# Patient Record
Sex: FEMALE | Race: WHITE
[De-identification: names, ages, dates, MRNs, and addresses within clinical notes are randomized per-mention and may not be internally consistent; named-entity substitution may affect disease eponyms.]

---

## 2017-11-17 ENCOUNTER — HOSPITAL ENCOUNTER (OUTPATIENT)
Dept: HOSPITAL 92 - MAMMO | Age: 72
Discharge: HOME | End: 2017-11-17
Payer: MEDICARE

## 2017-11-17 DIAGNOSIS — Z78.0: ICD-10-CM

## 2017-11-17 DIAGNOSIS — Z12.31: Primary | ICD-10-CM

## 2017-11-17 PROCEDURE — 77080 DXA BONE DENSITY AXIAL: CPT

## 2017-11-17 PROCEDURE — 77067 SCR MAMMO BI INCL CAD: CPT

## 2017-11-17 PROCEDURE — G0202 SCR MAMMO BI INCL CAD: HCPCS

## 2017-12-10 ENCOUNTER — HOSPITAL ENCOUNTER (INPATIENT)
Dept: HOSPITAL 92 - ERS | Age: 72
LOS: 4 days | Discharge: HOME | DRG: 871 | End: 2017-12-14
Attending: INTERNAL MEDICINE | Admitting: INTERNAL MEDICINE
Payer: MEDICARE

## 2017-12-10 VITALS — BODY MASS INDEX: 30.5 KG/M2

## 2017-12-10 DIAGNOSIS — E87.2: ICD-10-CM

## 2017-12-10 DIAGNOSIS — K21.9: ICD-10-CM

## 2017-12-10 DIAGNOSIS — M79.7: ICD-10-CM

## 2017-12-10 DIAGNOSIS — K55.9: ICD-10-CM

## 2017-12-10 DIAGNOSIS — F41.9: ICD-10-CM

## 2017-12-10 DIAGNOSIS — K52.9: ICD-10-CM

## 2017-12-10 DIAGNOSIS — K59.00: ICD-10-CM

## 2017-12-10 DIAGNOSIS — E66.9: ICD-10-CM

## 2017-12-10 DIAGNOSIS — E86.0: ICD-10-CM

## 2017-12-10 DIAGNOSIS — N39.41: ICD-10-CM

## 2017-12-10 DIAGNOSIS — E87.6: ICD-10-CM

## 2017-12-10 DIAGNOSIS — G89.29: ICD-10-CM

## 2017-12-10 DIAGNOSIS — N17.9: ICD-10-CM

## 2017-12-10 DIAGNOSIS — F17.210: ICD-10-CM

## 2017-12-10 DIAGNOSIS — M19.90: ICD-10-CM

## 2017-12-10 DIAGNOSIS — A41.9: Primary | ICD-10-CM

## 2017-12-10 DIAGNOSIS — J30.2: ICD-10-CM

## 2017-12-10 DIAGNOSIS — E11.42: ICD-10-CM

## 2017-12-10 DIAGNOSIS — T68.XXXA: ICD-10-CM

## 2017-12-10 DIAGNOSIS — K56.7: ICD-10-CM

## 2017-12-10 DIAGNOSIS — I10: ICD-10-CM

## 2017-12-10 DIAGNOSIS — Z79.4: ICD-10-CM

## 2017-12-10 DIAGNOSIS — Z88.1: ICD-10-CM

## 2017-12-10 DIAGNOSIS — M54.5: ICD-10-CM

## 2017-12-10 DIAGNOSIS — Z79.891: ICD-10-CM

## 2017-12-10 DIAGNOSIS — Z79.82: ICD-10-CM

## 2017-12-10 DIAGNOSIS — F32.9: ICD-10-CM

## 2017-12-10 DIAGNOSIS — R65.21: ICD-10-CM

## 2017-12-10 DIAGNOSIS — N30.00: ICD-10-CM

## 2017-12-10 DIAGNOSIS — Z88.8: ICD-10-CM

## 2017-12-10 DIAGNOSIS — E83.39: ICD-10-CM

## 2017-12-10 LAB
ALP SERPL-CCNC: 426 U/L (ref 40–150)
ALT SERPL W P-5'-P-CCNC: 22 U/L (ref 8–55)
ANION GAP SERPL CALC-SCNC: 20 MMOL/L (ref 10–20)
APTT PPP: 29.4 SEC (ref 22.9–36.1)
AST SERPL-CCNC: 41 U/L (ref 5–34)
BACTERIA UR QL AUTO: (no result) HPF
BASOPHILS # BLD AUTO: 0.1 THOU/UL (ref 0–0.2)
BASOPHILS NFR BLD AUTO: 0.7 % (ref 0–1)
BILIRUB SERPL-MCNC: 0.5 MG/DL (ref 0.2–1.2)
BUN SERPL-MCNC: 25 MG/DL (ref 9.8–20.1)
CALCIUM SERPL-MCNC: 9.5 MG/DL (ref 7.8–10.44)
CHLORIDE SERPL-SCNC: 105 MMOL/L (ref 98–107)
CK SERPL-CCNC: 152 U/L (ref 29–168)
CO2 SERPL-SCNC: 16 MMOL/L (ref 23–31)
CREAT CL PREDICTED SERPL C-G-VRATE: 0 ML/MIN (ref 70–130)
EOSINOPHIL # BLD AUTO: 0.2 THOU/UL (ref 0–0.7)
EOSINOPHIL NFR BLD AUTO: 1.1 % (ref 0–10)
GLOBULIN SER CALC-MCNC: 3.5 G/DL (ref 2.4–3.5)
GLUCOSE UR STRIP-MCNC: 100 MG/DL
HCT VFR BLD CALC: 48 % (ref 36–47)
HYALINE CASTS #/AREA URNS LPF: (no result) LPF
LACTATE SERPL-SCNC: 6.5 MMOL/L (ref 0.5–2.2)
LIPASE SERPL-CCNC: 123 U/L (ref 8–78)
LYMPHOCYTES # BLD: 4.8 THOU/UL (ref 1.2–3.4)
LYMPHOCYTES NFR BLD AUTO: 24.7 % (ref 21–51)
MONOCYTES # BLD AUTO: 0.4 THOU/UL (ref 0.11–0.59)
MONOCYTES NFR BLD AUTO: 2.1 % (ref 0–10)
NEUTROPHILS # BLD AUTO: 13.8 THOU/UL (ref 1.4–6.5)
PROT UR STRIP.AUTO-MCNC: 100 MG/DL
PROTHROMBIN TIME: 15.4 SEC (ref 12–14.7)
RBC # BLD AUTO: 4.82 MILL/UL (ref 4.2–5.4)
RBC UR QL AUTO: (no result) HPF (ref 0–3)
TROPONIN I SERPL DL<=0.01 NG/ML-MCNC: (no result) NG/ML (ref ?–0.03)
WBC # BLD AUTO: 19.3 THOU/UL (ref 4.8–10.8)
WBC UR QL AUTO: (no result) HPF (ref 0–3)

## 2017-12-10 PROCEDURE — 86901 BLOOD TYPING SEROLOGIC RH(D): CPT

## 2017-12-10 PROCEDURE — 87328 CRYPTOSPORIDIUM AG IA: CPT

## 2017-12-10 PROCEDURE — 83605 ASSAY OF LACTIC ACID: CPT

## 2017-12-10 PROCEDURE — 87899 AGENT NOS ASSAY W/OPTIC: CPT

## 2017-12-10 PROCEDURE — 87324 CLOSTRIDIUM AG IA: CPT

## 2017-12-10 PROCEDURE — 80202 ASSAY OF VANCOMYCIN: CPT

## 2017-12-10 PROCEDURE — 74177 CT ABD & PELVIS W/CONTRAST: CPT

## 2017-12-10 PROCEDURE — 87086 URINE CULTURE/COLONY COUNT: CPT

## 2017-12-10 PROCEDURE — S0028 INJECTION, FAMOTIDINE, 20 MG: HCPCS

## 2017-12-10 PROCEDURE — 70450 CT HEAD/BRAIN W/O DYE: CPT

## 2017-12-10 PROCEDURE — 96360 HYDRATION IV INFUSION INIT: CPT

## 2017-12-10 PROCEDURE — 80053 COMPREHEN METABOLIC PANEL: CPT

## 2017-12-10 PROCEDURE — 96365 THER/PROPH/DIAG IV INF INIT: CPT

## 2017-12-10 PROCEDURE — 93005 ELECTROCARDIOGRAM TRACING: CPT

## 2017-12-10 PROCEDURE — 74020: CPT

## 2017-12-10 PROCEDURE — 82274 ASSAY TEST FOR BLOOD FECAL: CPT

## 2017-12-10 PROCEDURE — 84100 ASSAY OF PHOSPHORUS: CPT

## 2017-12-10 PROCEDURE — 83880 ASSAY OF NATRIURETIC PEPTIDE: CPT

## 2017-12-10 PROCEDURE — 80048 BASIC METABOLIC PNL TOTAL CA: CPT

## 2017-12-10 PROCEDURE — 96368 THER/DIAG CONCURRENT INF: CPT

## 2017-12-10 PROCEDURE — 81003 URINALYSIS AUTO W/O SCOPE: CPT

## 2017-12-10 PROCEDURE — 36416 COLLJ CAPILLARY BLOOD SPEC: CPT

## 2017-12-10 PROCEDURE — 85730 THROMBOPLASTIN TIME PARTIAL: CPT

## 2017-12-10 PROCEDURE — 87045 FECES CULTURE AEROBIC BACT: CPT

## 2017-12-10 PROCEDURE — 87077 CULTURE AEROBIC IDENTIFY: CPT

## 2017-12-10 PROCEDURE — 85025 COMPLETE CBC W/AUTO DIFF WBC: CPT

## 2017-12-10 PROCEDURE — 36415 COLL VENOUS BLD VENIPUNCTURE: CPT

## 2017-12-10 PROCEDURE — 94760 N-INVAS EAR/PLS OXIMETRY 1: CPT

## 2017-12-10 PROCEDURE — 81015 MICROSCOPIC EXAM OF URINE: CPT

## 2017-12-10 PROCEDURE — 51702 INSERT TEMP BLADDER CATH: CPT

## 2017-12-10 PROCEDURE — 86900 BLOOD TYPING SEROLOGIC ABO: CPT

## 2017-12-10 PROCEDURE — 87329 GIARDIA AG IA: CPT

## 2017-12-10 PROCEDURE — 82553 CREATINE MB FRACTION: CPT

## 2017-12-10 PROCEDURE — 87015 SPECIMEN INFECT AGNT CONCNTJ: CPT

## 2017-12-10 PROCEDURE — 87449 NOS EACH ORGANISM AG IA: CPT

## 2017-12-10 PROCEDURE — 87040 BLOOD CULTURE FOR BACTERIA: CPT

## 2017-12-10 PROCEDURE — 84484 ASSAY OF TROPONIN QUANT: CPT

## 2017-12-10 PROCEDURE — A4216 STERILE WATER/SALINE, 10 ML: HCPCS

## 2017-12-10 PROCEDURE — 85610 PROTHROMBIN TIME: CPT

## 2017-12-10 PROCEDURE — 86850 RBC ANTIBODY SCREEN: CPT

## 2017-12-10 PROCEDURE — 82533 TOTAL CORTISOL: CPT

## 2017-12-10 PROCEDURE — 87046 STOOL CULTR AEROBIC BACT EA: CPT

## 2017-12-10 PROCEDURE — 87186 SC STD MICRODIL/AGAR DIL: CPT

## 2017-12-10 PROCEDURE — 83690 ASSAY OF LIPASE: CPT

## 2017-12-10 PROCEDURE — 71260 CT THORAX DX C+: CPT

## 2017-12-10 PROCEDURE — 83735 ASSAY OF MAGNESIUM: CPT

## 2017-12-10 RX ADMIN — Medication SCH: at 21:37

## 2017-12-10 RX ADMIN — HYDROCORTISONE SODIUM SUCCINATE SCH MG: 100 INJECTION, POWDER, FOR SOLUTION INTRAMUSCULAR; INTRAVENOUS at 21:35

## 2017-12-10 RX ADMIN — HYDROCORTISONE SODIUM SUCCINATE SCH MG: 100 INJECTION, POWDER, FOR SOLUTION INTRAMUSCULAR; INTRAVENOUS at 15:33

## 2017-12-10 RX ADMIN — PIPERACILLIN SODIUM AND TAZOBACTAM SODIUM SCH MLS: 3; .375 INJECTION, POWDER, LYOPHILIZED, FOR SOLUTION INTRAVENOUS at 23:59

## 2017-12-10 RX ADMIN — PIPERACILLIN SODIUM AND TAZOBACTAM SODIUM SCH MLS: 3; .375 INJECTION, POWDER, LYOPHILIZED, FOR SOLUTION INTRAVENOUS at 18:09

## 2017-12-10 RX ADMIN — FAMOTIDINE SCH MG: 10 INJECTION, SOLUTION INTRAVENOUS at 21:36

## 2017-12-11 LAB
ALP SERPL-CCNC: 260 U/L (ref 40–150)
ALT SERPL W P-5'-P-CCNC: 20 U/L (ref 8–55)
ANION GAP SERPL CALC-SCNC: 15 MMOL/L (ref 10–20)
AST SERPL-CCNC: 48 U/L (ref 5–34)
BILIRUB SERPL-MCNC: 0.4 MG/DL (ref 0.2–1.2)
BUN SERPL-MCNC: 25 MG/DL (ref 9.8–20.1)
CALCIUM SERPL-MCNC: 7.8 MG/DL (ref 7.8–10.44)
CHLORIDE SERPL-SCNC: 114 MMOL/L (ref 98–107)
CO2 SERPL-SCNC: 15 MMOL/L (ref 23–31)
CREAT CL PREDICTED SERPL C-G-VRATE: 47 ML/MIN (ref 70–130)
GLOBULIN SER CALC-MCNC: 2.8 G/DL (ref 2.4–3.5)
HCT VFR BLD CALC: 40.7 % (ref 36–47)
LIPASE SERPL-CCNC: 10 U/L (ref 8–78)
MAGNESIUM SERPL-MCNC: 2.1 MG/DL (ref 1.6–2.6)
RBC # BLD AUTO: 4.07 MILL/UL (ref 4.2–5.4)
WBC # BLD AUTO: 27.5 THOU/UL (ref 4.8–10.8)

## 2017-12-11 RX ADMIN — PIPERACILLIN SODIUM AND TAZOBACTAM SODIUM SCH MLS: 3; .375 INJECTION, POWDER, LYOPHILIZED, FOR SOLUTION INTRAVENOUS at 11:22

## 2017-12-11 RX ADMIN — Medication SCH: at 08:18

## 2017-12-11 RX ADMIN — BETHANECHOL CHLORIDE SCH MG: 10 TABLET ORAL at 20:42

## 2017-12-11 RX ADMIN — DILTIAZEM HYDROCHLORIDE SCH MG: 60 CAPSULE, EXTENDED RELEASE ORAL at 20:42

## 2017-12-11 RX ADMIN — Medication SCH ML: at 20:43

## 2017-12-11 RX ADMIN — INSULIN LISPRO PRN UNIT: 100 INJECTION, SOLUTION INTRAVENOUS; SUBCUTANEOUS at 06:15

## 2017-12-11 RX ADMIN — PIPERACILLIN SODIUM AND TAZOBACTAM SODIUM SCH MLS: 3; .375 INJECTION, POWDER, LYOPHILIZED, FOR SOLUTION INTRAVENOUS at 18:04

## 2017-12-11 RX ADMIN — HYDROCODONE BITARTRATE AND ACETAMINOPHEN PRN TAB: 10; 325 TABLET ORAL at 21:00

## 2017-12-11 RX ADMIN — DILTIAZEM HYDROCHLORIDE SCH MG: 60 CAPSULE, EXTENDED RELEASE ORAL at 11:17

## 2017-12-11 RX ADMIN — FAMOTIDINE SCH MG: 10 INJECTION, SOLUTION INTRAVENOUS at 08:17

## 2017-12-11 RX ADMIN — PIPERACILLIN SODIUM AND TAZOBACTAM SODIUM SCH MLS: 3; .375 INJECTION, POWDER, LYOPHILIZED, FOR SOLUTION INTRAVENOUS at 05:55

## 2017-12-11 RX ADMIN — BETHANECHOL CHLORIDE SCH: 10 TABLET ORAL at 16:09

## 2017-12-11 RX ADMIN — HYDROCORTISONE SODIUM SUCCINATE SCH MG: 100 INJECTION, POWDER, FOR SOLUTION INTRAMUSCULAR; INTRAVENOUS at 08:22

## 2017-12-11 RX ADMIN — VANCOMYCIN HYDROCHLORIDE SCH MLS: 1 INJECTION, SOLUTION INTRAVENOUS at 13:07

## 2017-12-11 RX ADMIN — BETHANECHOL CHLORIDE SCH MG: 10 TABLET ORAL at 11:15

## 2017-12-11 RX ADMIN — HYDROCORTISONE SODIUM SUCCINATE SCH MG: 100 INJECTION, POWDER, FOR SOLUTION INTRAMUSCULAR; INTRAVENOUS at 02:18

## 2017-12-12 LAB
ALP SERPL-CCNC: 160 U/L (ref 40–150)
ALT SERPL W P-5'-P-CCNC: 24 U/L (ref 8–55)
ANION GAP SERPL CALC-SCNC: 9 MMOL/L (ref 10–20)
AST SERPL-CCNC: 63 U/L (ref 5–34)
BILIRUB SERPL-MCNC: 0.5 MG/DL (ref 0.2–1.2)
BUN SERPL-MCNC: 15 MG/DL (ref 9.8–20.1)
CALCIUM SERPL-MCNC: 8.2 MG/DL (ref 7.8–10.44)
CHLORIDE SERPL-SCNC: 114 MMOL/L (ref 98–107)
CO2 SERPL-SCNC: 20 MMOL/L (ref 23–31)
CREAT CL PREDICTED SERPL C-G-VRATE: 70 ML/MIN (ref 70–130)
GLOBULIN SER CALC-MCNC: 2.4 G/DL (ref 2.4–3.5)
HCT VFR BLD CALC: 33.9 % (ref 36–47)
MAGNESIUM SERPL-MCNC: 1.8 MG/DL (ref 1.6–2.6)
RBC # BLD AUTO: 3.41 MILL/UL (ref 4.2–5.4)
VANCOMYCIN TROUGH SERPL-MCNC: 5.5 UG/ML
WBC # BLD AUTO: 16.7 THOU/UL (ref 4.8–10.8)

## 2017-12-12 RX ADMIN — INSULIN LISPRO PRN UNIT: 100 INJECTION, SOLUTION INTRAVENOUS; SUBCUTANEOUS at 13:10

## 2017-12-12 RX ADMIN — PIPERACILLIN SODIUM AND TAZOBACTAM SODIUM SCH MLS: 3; .375 INJECTION, POWDER, LYOPHILIZED, FOR SOLUTION INTRAVENOUS at 05:51

## 2017-12-12 RX ADMIN — PIPERACILLIN SODIUM AND TAZOBACTAM SODIUM SCH MLS: 3; .375 INJECTION, POWDER, LYOPHILIZED, FOR SOLUTION INTRAVENOUS at 12:59

## 2017-12-12 RX ADMIN — Medication SCH: at 21:08

## 2017-12-12 RX ADMIN — VANCOMYCIN HYDROCHLORIDE SCH: 1 INJECTION, SOLUTION INTRAVENOUS at 13:42

## 2017-12-12 RX ADMIN — DILTIAZEM HYDROCHLORIDE SCH MG: 60 CAPSULE, EXTENDED RELEASE ORAL at 20:52

## 2017-12-12 RX ADMIN — PIPERACILLIN SODIUM AND TAZOBACTAM SODIUM SCH MLS: 3; .375 INJECTION, POWDER, LYOPHILIZED, FOR SOLUTION INTRAVENOUS at 18:28

## 2017-12-12 RX ADMIN — DILTIAZEM HYDROCHLORIDE SCH MG: 60 CAPSULE, EXTENDED RELEASE ORAL at 09:06

## 2017-12-12 RX ADMIN — HYDROCODONE BITARTRATE AND ACETAMINOPHEN PRN TAB: 10; 325 TABLET ORAL at 10:23

## 2017-12-12 RX ADMIN — VANCOMYCIN HYDROCHLORIDE SCH MLS: 1 INJECTION, SOLUTION INTRAVENOUS at 14:17

## 2017-12-12 RX ADMIN — PIPERACILLIN SODIUM AND TAZOBACTAM SODIUM SCH MLS: 3; .375 INJECTION, POWDER, LYOPHILIZED, FOR SOLUTION INTRAVENOUS at 00:14

## 2017-12-12 RX ADMIN — BETHANECHOL CHLORIDE SCH MG: 10 TABLET ORAL at 09:04

## 2017-12-12 RX ADMIN — PIPERACILLIN SODIUM AND TAZOBACTAM SODIUM SCH MLS: 3; .375 INJECTION, POWDER, LYOPHILIZED, FOR SOLUTION INTRAVENOUS at 23:07

## 2017-12-12 RX ADMIN — Medication SCH ML: at 09:21

## 2017-12-12 RX ADMIN — BETHANECHOL CHLORIDE SCH MG: 10 TABLET ORAL at 20:50

## 2017-12-12 RX ADMIN — HYDROCODONE BITARTRATE AND ACETAMINOPHEN PRN TAB: 10; 325 TABLET ORAL at 21:03

## 2017-12-12 RX ADMIN — BETHANECHOL CHLORIDE SCH MG: 10 TABLET ORAL at 15:10

## 2017-12-13 LAB
ANION GAP SERPL CALC-SCNC: 9 MMOL/L (ref 10–20)
BASOPHILS # BLD AUTO: 0 THOU/UL (ref 0–0.2)
BASOPHILS NFR BLD AUTO: 0.2 % (ref 0–1)
BUN SERPL-MCNC: 9 MG/DL (ref 9.8–20.1)
CALCIUM SERPL-MCNC: 8.9 MG/DL (ref 7.8–10.44)
CHLORIDE SERPL-SCNC: 109 MMOL/L (ref 98–107)
CO2 SERPL-SCNC: 26 MMOL/L (ref 23–31)
CREAT CL PREDICTED SERPL C-G-VRATE: 68 ML/MIN (ref 70–130)
EOSINOPHIL # BLD AUTO: 0.1 THOU/UL (ref 0–0.7)
EOSINOPHIL NFR BLD AUTO: 1.2 % (ref 0–10)
HCT VFR BLD CALC: 34.7 % (ref 36–47)
LYMPHOCYTES # BLD: 2 THOU/UL (ref 1.2–3.4)
LYMPHOCYTES NFR BLD AUTO: 16.3 % (ref 21–51)
MONOCYTES # BLD AUTO: 0.5 THOU/UL (ref 0.11–0.59)
MONOCYTES NFR BLD AUTO: 4.3 % (ref 0–10)
NEUTROPHILS # BLD AUTO: 9.5 THOU/UL (ref 1.4–6.5)
RBC # BLD AUTO: 3.48 MILL/UL (ref 4.2–5.4)
WBC # BLD AUTO: 12.1 THOU/UL (ref 4.8–10.8)

## 2017-12-13 RX ADMIN — DILTIAZEM HYDROCHLORIDE SCH MG: 60 CAPSULE, EXTENDED RELEASE ORAL at 20:31

## 2017-12-13 RX ADMIN — DILTIAZEM HYDROCHLORIDE SCH MG: 60 CAPSULE, EXTENDED RELEASE ORAL at 09:01

## 2017-12-13 RX ADMIN — BETHANECHOL CHLORIDE SCH MG: 10 TABLET ORAL at 09:04

## 2017-12-13 RX ADMIN — BETHANECHOL CHLORIDE SCH MG: 10 TABLET ORAL at 16:38

## 2017-12-13 RX ADMIN — VANCOMYCIN HYDROCHLORIDE SCH MLS: 1 INJECTION, SOLUTION INTRAVENOUS at 12:37

## 2017-12-13 RX ADMIN — VANCOMYCIN HYDROCHLORIDE SCH MLS: 1 INJECTION, SOLUTION INTRAVENOUS at 01:41

## 2017-12-13 RX ADMIN — PIPERACILLIN SODIUM AND TAZOBACTAM SODIUM SCH MLS: 3; .375 INJECTION, POWDER, LYOPHILIZED, FOR SOLUTION INTRAVENOUS at 11:33

## 2017-12-13 RX ADMIN — Medication SCH: at 20:32

## 2017-12-13 RX ADMIN — BETHANECHOL CHLORIDE SCH MG: 10 TABLET ORAL at 20:29

## 2017-12-13 RX ADMIN — Medication SCH: at 09:04

## 2017-12-13 RX ADMIN — PIPERACILLIN SODIUM AND TAZOBACTAM SODIUM SCH MLS: 3; .375 INJECTION, POWDER, LYOPHILIZED, FOR SOLUTION INTRAVENOUS at 04:18

## 2017-12-14 VITALS — TEMPERATURE: 98.1 F | DIASTOLIC BLOOD PRESSURE: 68 MMHG | SYSTOLIC BLOOD PRESSURE: 149 MMHG

## 2017-12-14 RX ADMIN — BETHANECHOL CHLORIDE SCH MG: 10 TABLET ORAL at 10:05

## 2017-12-14 RX ADMIN — DILTIAZEM HYDROCHLORIDE SCH MG: 60 CAPSULE, EXTENDED RELEASE ORAL at 10:06

## 2017-12-14 RX ADMIN — Medication SCH ML: at 10:08

## 2017-12-16 ENCOUNTER — HOSPITAL ENCOUNTER (EMERGENCY)
Dept: HOSPITAL 18 - NAV ERS | Age: 72
LOS: 1 days | Discharge: HOME | End: 2017-12-17
Payer: MEDICARE

## 2017-12-16 DIAGNOSIS — I10: ICD-10-CM

## 2017-12-16 DIAGNOSIS — F32.9: ICD-10-CM

## 2017-12-16 DIAGNOSIS — Z79.899: ICD-10-CM

## 2017-12-16 DIAGNOSIS — Z79.84: ICD-10-CM

## 2017-12-16 DIAGNOSIS — E11.9: ICD-10-CM

## 2017-12-16 DIAGNOSIS — G89.29: ICD-10-CM

## 2017-12-16 DIAGNOSIS — K21.9: Primary | ICD-10-CM

## 2017-12-16 DIAGNOSIS — F17.210: ICD-10-CM

## 2017-12-16 DIAGNOSIS — M54.9: ICD-10-CM

## 2017-12-16 LAB
ALBUMIN SERPL BCG-MCNC: 3.4 G/DL (ref 3.4–4.8)
ALP SERPL-CCNC: 77 U/L (ref 40–150)
ALT SERPL W P-5'-P-CCNC: 24 U/L (ref 8–55)
AMYLASE SERPL-CCNC: 40 U/L (ref 20–160)
ANION GAP SERPL CALC-SCNC: 13 MMOL/L (ref 10–20)
APTT PPP: 34.4 SEC (ref 22.9–36.1)
AST SERPL-CCNC: 38 U/L (ref 5–34)
BASOPHILS # BLD AUTO: 0.1 THOU/UL (ref 0–0.2)
BASOPHILS NFR BLD AUTO: 1.1 % (ref 0–1)
BILIRUB DIRECT SERPL-MCNC: 0.2 MG/DL (ref 0.1–0.3)
BILIRUB SERPL-MCNC: 0.3 MG/DL (ref 0.2–1.2)
BUN SERPL-MCNC: 11 MG/DL (ref 9.8–20.1)
CALCIUM SERPL-MCNC: 8.8 MG/DL (ref 7.8–10.44)
CHLORIDE SERPL-SCNC: 107 MMOL/L (ref 98–107)
CK MB SERPL-MCNC: 3.8 NG/ML (ref 0–6.6)
CO2 SERPL-SCNC: 25 MMOL/L (ref 23–31)
CREAT CL PREDICTED SERPL C-G-VRATE: 0 ML/MIN (ref 70–130)
EOSINOPHIL # BLD AUTO: 0.3 THOU/UL (ref 0–0.7)
EOSINOPHIL NFR BLD AUTO: 3.8 % (ref 0–10)
GLUCOSE SERPL-MCNC: 146 MG/DL (ref 83–110)
HGB BLD-MCNC: 10.5 G/DL (ref 12–16)
INR PPP: 1
LYMPHOCYTES # BLD AUTO: 2 THOU/UL (ref 1.2–3.4)
LYMPHOCYTES NFR BLD AUTO: 25.4 % (ref 21–51)
MCH RBC QN AUTO: 30.3 PG (ref 27–31)
MCV RBC AUTO: 93.5 FL (ref 81–99)
MONOCYTES # BLD AUTO: 0.8 THOU/UL (ref 0.11–0.59)
MONOCYTES NFR BLD AUTO: 9.7 % (ref 0–10)
NEUTROPHILS # BLD AUTO: 4.7 THOU/UL (ref 1.4–6.5)
NEUTROPHILS NFR BLD AUTO: 60 % (ref 42–75)
PLATELET # BLD AUTO: 185 THOU/UL (ref 130–400)
POTASSIUM SERPL-SCNC: 3.3 MMOL/L (ref 3.5–5.1)
PROTHROMBIN TIME: 13.5 SEC (ref 12–14.7)
RBC # BLD AUTO: 3.47 MILL/UL (ref 4.2–5.4)
SODIUM SERPL-SCNC: 142 MMOL/L (ref 136–145)
TROPONIN I SERPL DL<=0.01 NG/ML-MCNC: (no result) NG/ML (ref ?–0.03)
WBC # BLD AUTO: 7.8 THOU/UL (ref 4.8–10.8)

## 2017-12-16 PROCEDURE — 85730 THROMBOPLASTIN TIME PARTIAL: CPT

## 2017-12-16 PROCEDURE — 80076 HEPATIC FUNCTION PANEL: CPT

## 2017-12-16 PROCEDURE — 81003 URINALYSIS AUTO W/O SCOPE: CPT

## 2017-12-16 PROCEDURE — 81015 MICROSCOPIC EXAM OF URINE: CPT

## 2017-12-16 PROCEDURE — 96376 TX/PRO/DX INJ SAME DRUG ADON: CPT

## 2017-12-16 PROCEDURE — 80048 BASIC METABOLIC PNL TOTAL CA: CPT

## 2017-12-16 PROCEDURE — 84484 ASSAY OF TROPONIN QUANT: CPT

## 2017-12-16 PROCEDURE — 85025 COMPLETE CBC W/AUTO DIFF WBC: CPT

## 2017-12-16 PROCEDURE — 82150 ASSAY OF AMYLASE: CPT

## 2017-12-16 PROCEDURE — 96374 THER/PROPH/DIAG INJ IV PUSH: CPT

## 2017-12-16 PROCEDURE — 82553 CREATINE MB FRACTION: CPT

## 2017-12-16 PROCEDURE — 83605 ASSAY OF LACTIC ACID: CPT

## 2017-12-16 PROCEDURE — 96375 TX/PRO/DX INJ NEW DRUG ADDON: CPT

## 2017-12-16 PROCEDURE — 85610 PROTHROMBIN TIME: CPT

## 2017-12-16 PROCEDURE — 83690 ASSAY OF LIPASE: CPT

## 2017-12-16 PROCEDURE — S0028 INJECTION, FAMOTIDINE, 20 MG: HCPCS

## 2017-12-17 LAB
BACTERIA UR QL AUTO: (no result) HPF
GLUCOSE UR STRIP-MCNC: 100 MG/DL
PROT UR STRIP.AUTO-MCNC: 30 MG/DL
RBC UR QL AUTO: (no result) HPF (ref 0–3)
SP GR UR STRIP: 1.02 (ref 1–1.03)
WBC UR QL AUTO: (no result) HPF (ref 0–3)

## 2019-01-24 ENCOUNTER — HOSPITAL ENCOUNTER (OUTPATIENT)
Dept: HOSPITAL 18 - NAV LABSP | Age: 74
Discharge: HOME | End: 2019-01-24
Attending: FAMILY MEDICINE
Payer: MEDICARE

## 2019-01-24 DIAGNOSIS — N39.41: Primary | ICD-10-CM

## 2019-01-24 LAB
BACTERIA UR QL AUTO: (no result) HPF
GLUCOSE UR STRIP-MCNC: >=1000 MG/DL
RBC UR QL AUTO: (no result) HPF (ref 0–3)
SP GR UR STRIP: 1.02 (ref 1–1.03)
WBC UR QL AUTO: (no result) HPF (ref 0–3)

## 2019-01-24 PROCEDURE — 87086 URINE CULTURE/COLONY COUNT: CPT

## 2019-01-24 PROCEDURE — 87077 CULTURE AEROBIC IDENTIFY: CPT

## 2019-01-24 PROCEDURE — 81001 URINALYSIS AUTO W/SCOPE: CPT

## 2019-04-30 ENCOUNTER — HOSPITAL ENCOUNTER (OUTPATIENT)
Dept: HOSPITAL 18 - NAV RAD | Age: 74
Discharge: HOME | End: 2019-04-30
Payer: MEDICARE

## 2019-04-30 DIAGNOSIS — M43.16: Primary | ICD-10-CM

## 2019-04-30 PROCEDURE — 72120 X-RAY BEND ONLY L-S SPINE: CPT

## 2019-04-30 NOTE — RAD
Lumbar radiographs with bending lateral projections



Images: 5



INDICATION: Low back pain with history of spondylolisthesis



COMPARISON: Prior exam dated January 21, 2014



FINDINGS:



Disc and facet joints: There is moderate disc degenerative disease at L4-5 and L5-S1.



Fracture: None.



Alignment: There is grade 1 anterolisthesis of L3 on L4 and L2 on L3 that is accentuated with flexion
 but does not appreciably reduce with extension.



Prevertebral soft tissues: There are scattered vascular calcifications involving the abdominal pelvic
 vasculature. There are cholecystectomy clips within the right upper quadrant.



IMPRESSION: Worsening abnormal translational motion seen at L3-4 and L2-3 with the accentuated grade 
1 anterolisthesis of L3 on L4 and L2 on L3 with flexion. There is no appreciable reduction seen

with extension. There is moderate spondylosis of the lumbar spine that is largely stable to the prior
 exam.



Reported By: Carmelo Wilkinson 

Electronically Signed:  4/30/2019 10:48 AM

## 2019-07-11 ENCOUNTER — HOSPITAL ENCOUNTER (EMERGENCY)
Dept: HOSPITAL 18 - NAV ERS | Age: 74
Discharge: HOME | End: 2019-07-11
Payer: MEDICARE

## 2019-07-11 DIAGNOSIS — E78.5: ICD-10-CM

## 2019-07-11 DIAGNOSIS — Z79.899: ICD-10-CM

## 2019-07-11 DIAGNOSIS — G47.00: ICD-10-CM

## 2019-07-11 DIAGNOSIS — I10: ICD-10-CM

## 2019-07-11 DIAGNOSIS — M79.7: ICD-10-CM

## 2019-07-11 DIAGNOSIS — F41.9: ICD-10-CM

## 2019-07-11 DIAGNOSIS — E86.0: ICD-10-CM

## 2019-07-11 DIAGNOSIS — E11.9: ICD-10-CM

## 2019-07-11 DIAGNOSIS — R42: Primary | ICD-10-CM

## 2019-07-11 DIAGNOSIS — Z79.4: ICD-10-CM

## 2019-07-11 DIAGNOSIS — F32.9: ICD-10-CM

## 2019-07-11 DIAGNOSIS — T43.295A: ICD-10-CM

## 2019-07-11 DIAGNOSIS — F17.210: ICD-10-CM

## 2019-07-11 DIAGNOSIS — M19.90: ICD-10-CM

## 2019-07-11 LAB
ALBUMIN SERPL BCG-MCNC: 4.1 G/DL (ref 3.4–4.8)
ALP SERPL-CCNC: 100 U/L (ref 40–150)
ALT SERPL W P-5'-P-CCNC: 17 U/L (ref 8–55)
ANION GAP SERPL CALC-SCNC: 17 MMOL/L (ref 10–20)
AST SERPL-CCNC: 15 U/L (ref 5–34)
BASOPHILS # BLD AUTO: 0.1 THOU/UL (ref 0–0.2)
BASOPHILS NFR BLD AUTO: 1.1 % (ref 0–1)
BILIRUB SERPL-MCNC: 0.4 MG/DL (ref 0.2–1.2)
BUN SERPL-MCNC: 22 MG/DL (ref 9.8–20.1)
CALCIUM SERPL-MCNC: 9.6 MG/DL (ref 7.8–10.44)
CHLORIDE SERPL-SCNC: 99 MMOL/L (ref 98–107)
CK SERPL-CCNC: 87 U/L (ref 29–168)
CO2 SERPL-SCNC: 25 MMOL/L (ref 23–31)
CREAT CL PREDICTED SERPL C-G-VRATE: 0 ML/MIN (ref 70–130)
EOSINOPHIL # BLD AUTO: 0.4 THOU/UL (ref 0–0.7)
EOSINOPHIL NFR BLD AUTO: 5.6 % (ref 0–10)
GLOBULIN SER CALC-MCNC: 3.4 G/DL (ref 2.4–3.5)
GLUCOSE SERPL-MCNC: 445 MG/DL (ref 83–110)
HGB BLD-MCNC: 13.4 G/DL (ref 12–16)
LYMPHOCYTES # BLD AUTO: 2.1 THOU/UL (ref 1.2–3.4)
LYMPHOCYTES NFR BLD AUTO: 29.2 % (ref 21–51)
MCH RBC QN AUTO: 29.8 PG (ref 27–31)
MCV RBC AUTO: 94 FL (ref 78–98)
MONOCYTES # BLD AUTO: 0.4 THOU/UL (ref 0.11–0.59)
MONOCYTES NFR BLD AUTO: 5.9 % (ref 0–10)
NEUTROPHILS # BLD AUTO: 4.2 THOU/UL (ref 1.4–6.5)
NEUTROPHILS NFR BLD AUTO: 58.3 % (ref 42–75)
PLATELET # BLD AUTO: 267 THOU/UL (ref 130–400)
POTASSIUM SERPL-SCNC: 5.3 MMOL/L (ref 3.5–5.1)
RBC # BLD AUTO: 4.5 MILL/UL (ref 4.2–5.4)
SODIUM SERPL-SCNC: 136 MMOL/L (ref 136–145)
WBC # BLD AUTO: 7.2 THOU/UL (ref 4.8–10.8)

## 2019-07-11 PROCEDURE — 93005 ELECTROCARDIOGRAM TRACING: CPT

## 2019-07-11 PROCEDURE — 84484 ASSAY OF TROPONIN QUANT: CPT

## 2019-07-11 PROCEDURE — 82550 ASSAY OF CK (CPK): CPT

## 2019-07-11 PROCEDURE — 71045 X-RAY EXAM CHEST 1 VIEW: CPT

## 2019-07-11 PROCEDURE — 85025 COMPLETE CBC W/AUTO DIFF WBC: CPT

## 2019-07-11 PROCEDURE — 80053 COMPREHEN METABOLIC PANEL: CPT

## 2019-07-11 NOTE — RAD
PORTABLE CHEST 1 VIEW:

 

Date:  07/11/19 

Time:  1650 hours

 

HISTORY:  

Chest pain. 

 

FINDINGS:

Heart size normal. Lungs are expanded without lobar consolidation, pneumothoraces, or pleural effusio
ns. 

 

IMPRESSION: 

No radiographic evidence of acute cardiopulmonary process. 

 

 

POS: [facility]

## 2019-10-18 ENCOUNTER — HOSPITAL ENCOUNTER (INPATIENT)
Dept: HOSPITAL 18 - NAV ERS | Age: 74
LOS: 3 days | Discharge: NURSING, REHAB OR LONG-TERM CARE | DRG: 690 | End: 2019-10-21
Attending: FAMILY MEDICINE | Admitting: FAMILY MEDICINE
Payer: MEDICARE

## 2019-10-18 VITALS — BODY MASS INDEX: 29.4 KG/M2

## 2019-10-18 DIAGNOSIS — B96.20: ICD-10-CM

## 2019-10-18 DIAGNOSIS — E11.40: ICD-10-CM

## 2019-10-18 DIAGNOSIS — M19.91: ICD-10-CM

## 2019-10-18 DIAGNOSIS — N39.0: Primary | ICD-10-CM

## 2019-10-18 DIAGNOSIS — Z98.49: ICD-10-CM

## 2019-10-18 DIAGNOSIS — E86.0: ICD-10-CM

## 2019-10-18 DIAGNOSIS — E11.21: ICD-10-CM

## 2019-10-18 DIAGNOSIS — Z88.8: ICD-10-CM

## 2019-10-18 DIAGNOSIS — Z90.710: ICD-10-CM

## 2019-10-18 DIAGNOSIS — J30.9: ICD-10-CM

## 2019-10-18 DIAGNOSIS — R78.81: ICD-10-CM

## 2019-10-18 DIAGNOSIS — K21.9: ICD-10-CM

## 2019-10-18 DIAGNOSIS — F32.9: ICD-10-CM

## 2019-10-18 DIAGNOSIS — E78.5: ICD-10-CM

## 2019-10-18 DIAGNOSIS — B95.7: ICD-10-CM

## 2019-10-18 DIAGNOSIS — M79.7: ICD-10-CM

## 2019-10-18 DIAGNOSIS — F41.9: ICD-10-CM

## 2019-10-18 DIAGNOSIS — I10: ICD-10-CM

## 2019-10-18 DIAGNOSIS — Z90.49: ICD-10-CM

## 2019-10-18 LAB
ALBUMIN SERPL BCG-MCNC: 3.9 G/DL (ref 3.4–4.8)
ALP SERPL-CCNC: 84 U/L (ref 40–110)
ALT SERPL W P-5'-P-CCNC: 14 U/L (ref 8–55)
ANION GAP SERPL CALC-SCNC: 15 MMOL/L (ref 10–20)
ANION GAP SERPL CALC-SCNC: 17 MMOL/L (ref 10–20)
AST SERPL-CCNC: 13 U/L (ref 5–34)
BACTERIA UR QL AUTO: (no result) HPF
BASOPHILS # BLD AUTO: 0.1 THOU/UL (ref 0–0.2)
BASOPHILS NFR BLD AUTO: 0.3 % (ref 0–1)
BILIRUB SERPL-MCNC: 1.1 MG/DL (ref 0.2–1.2)
BUN SERPL-MCNC: 20 MG/DL (ref 9.8–20.1)
BUN SERPL-MCNC: 24 MG/DL (ref 9.8–20.1)
CALCIUM SERPL-MCNC: 8.8 MG/DL (ref 7.8–10.44)
CALCIUM SERPL-MCNC: 9.5 MG/DL (ref 7.8–10.44)
CHLORIDE SERPL-SCNC: 101 MMOL/L (ref 98–107)
CHLORIDE SERPL-SCNC: 97 MMOL/L (ref 98–107)
CO2 SERPL-SCNC: 22 MMOL/L (ref 23–31)
CO2 SERPL-SCNC: 23 MMOL/L (ref 23–31)
CREAT CL PREDICTED SERPL C-G-VRATE: 0 ML/MIN (ref 70–130)
CREAT CL PREDICTED SERPL C-G-VRATE: 47 ML/MIN (ref 70–130)
DRUG SCREEN CUTOFF: (no result)
EOSINOPHIL # BLD AUTO: 0 THOU/UL (ref 0–0.7)
EOSINOPHIL NFR BLD AUTO: 0 % (ref 0–10)
GLOBULIN SER CALC-MCNC: 3.3 G/DL (ref 2.4–3.5)
GLUCOSE SERPL-MCNC: 295 MG/DL (ref 83–110)
GLUCOSE SERPL-MCNC: 378 MG/DL (ref 83–110)
GLUCOSE UR STRIP-MCNC: >=1000 MG/DL
HGB BLD-MCNC: 12.5 G/DL (ref 12–16)
HGB BLD-MCNC: 12.5 G/DL (ref 12–16)
LYMPHOCYTES # BLD AUTO: 1.1 THOU/UL (ref 1.2–3.4)
LYMPHOCYTES NFR BLD AUTO: 5.5 % (ref 21–51)
MCH RBC QN AUTO: 30.5 PG (ref 27–31)
MCH RBC QN AUTO: 31.1 PG (ref 27–31)
MCV RBC AUTO: 93 FL (ref 78–98)
MCV RBC AUTO: 93.1 FL (ref 78–98)
MDIFF COMPLETE?: YES
MEDTOX CONTROL LINE VALID?: (no result)
MONOCYTES # BLD AUTO: 1.1 THOU/UL (ref 0.11–0.59)
MONOCYTES NFR BLD AUTO: 5.7 % (ref 0–10)
NEUTROPHILS # BLD AUTO: 17.6 THOU/UL (ref 1.4–6.5)
NEUTROPHILS NFR BLD AUTO: 88.5 % (ref 42–75)
PLATELET # BLD AUTO: 166 THOU/UL (ref 130–400)
PLATELET # BLD AUTO: 174 THOU/UL (ref 130–400)
POTASSIUM SERPL-SCNC: 4.3 MMOL/L (ref 3.5–5.1)
POTASSIUM SERPL-SCNC: 4.5 MMOL/L (ref 3.5–5.1)
PROT UR STRIP.AUTO-MCNC: 30 MG/DL
RBC # BLD AUTO: 4.02 MILL/UL (ref 4.2–5.4)
RBC # BLD AUTO: 4.11 MILL/UL (ref 4.2–5.4)
RBC UR QL AUTO: (no result) HPF (ref 0–3)
SODIUM SERPL-SCNC: 131 MMOL/L (ref 136–145)
SODIUM SERPL-SCNC: 135 MMOL/L (ref 136–145)
WBC # BLD AUTO: 19.9 THOU/UL (ref 4.8–10.8)
WBC # BLD AUTO: 23.1 THOU/UL (ref 4.8–10.8)

## 2019-10-18 PROCEDURE — 90686 IIV4 VACC NO PRSV 0.5 ML IM: CPT

## 2019-10-18 PROCEDURE — 81003 URINALYSIS AUTO W/O SCOPE: CPT

## 2019-10-18 PROCEDURE — 96361 HYDRATE IV INFUSION ADD-ON: CPT

## 2019-10-18 PROCEDURE — 96365 THER/PROPH/DIAG IV INF INIT: CPT

## 2019-10-18 PROCEDURE — 93005 ELECTROCARDIOGRAM TRACING: CPT

## 2019-10-18 PROCEDURE — 71045 X-RAY EXAM CHEST 1 VIEW: CPT

## 2019-10-18 PROCEDURE — 81015 MICROSCOPIC EXAM OF URINE: CPT

## 2019-10-18 PROCEDURE — 87040 BLOOD CULTURE FOR BACTERIA: CPT

## 2019-10-18 PROCEDURE — 80053 COMPREHEN METABOLIC PANEL: CPT

## 2019-10-18 PROCEDURE — 87086 URINE CULTURE/COLONY COUNT: CPT

## 2019-10-18 PROCEDURE — 87186 SC STD MICRODIL/AGAR DIL: CPT

## 2019-10-18 PROCEDURE — 81001 URINALYSIS AUTO W/SCOPE: CPT

## 2019-10-18 PROCEDURE — 36416 COLLJ CAPILLARY BLOOD SPEC: CPT

## 2019-10-18 PROCEDURE — 70450 CT HEAD/BRAIN W/O DYE: CPT

## 2019-10-18 PROCEDURE — 80306 DRUG TEST PRSMV INSTRMNT: CPT

## 2019-10-18 PROCEDURE — 83735 ASSAY OF MAGNESIUM: CPT

## 2019-10-18 PROCEDURE — 87077 CULTURE AEROBIC IDENTIFY: CPT

## 2019-10-18 PROCEDURE — 83605 ASSAY OF LACTIC ACID: CPT

## 2019-10-18 PROCEDURE — 90471 IMMUNIZATION ADMIN: CPT

## 2019-10-18 PROCEDURE — G0008 ADMIN INFLUENZA VIRUS VAC: HCPCS

## 2019-10-18 PROCEDURE — 87149 DNA/RNA DIRECT PROBE: CPT

## 2019-10-18 PROCEDURE — 85025 COMPLETE CBC W/AUTO DIFF WBC: CPT

## 2019-10-18 PROCEDURE — 94760 N-INVAS EAR/PLS OXIMETRY 1: CPT

## 2019-10-18 RX ADMIN — INSULIN GLARGINE SCH UNITS: 100 INJECTION, SOLUTION SUBCUTANEOUS at 21:31

## 2019-10-18 RX ADMIN — Medication SCH ML: at 08:40

## 2019-10-18 RX ADMIN — HYDROCODONE BITARTRATE AND ACETAMINOPHEN PRN TAB: 10; 325 TABLET ORAL at 23:21

## 2019-10-18 RX ADMIN — Medication SCH ML: at 21:33

## 2019-10-18 NOTE — CT
PRELIMINARY REPORT/[facility]/EMERGENCY AFTER HOURS PROCEDURE



PROCEDURE INFORMATION:

Exam: CT Head Without Contrast



Exam date and time: 10/18/2019 4:37 AM



Clinical history: 74 years old, female; Pain; Headache not specified; Patient HX: Dizzy; Headache;

Weak



TECHNIQUE:

Imaging protocol: Computed tomography of the head without contrast.

Radiation optimization: All CT scans at this facility use at least one of these dose optimization

techniques: automated exposure control; mA and/or kV adjustment per patient size (includes targeted

exams where dose is matched to clinical indication); or iterative reconstruction.



COMPARISON:

No relevant prior studies available.



FINDINGS:

Brain: Mild generalized volume loss of the brain. No brain edema. No intracranial hemorrhage.

Ventricles: Normal. No ventriculomegaly.

Bones/joints: Unremarkable. No acute fracture.

Sinuses: Dependent mucus in the sphenoid sinuses may significant sinusitis.

Mastoid air cells: Visualized mastoid air cells are well aerated.

Soft tissues: Unremarkable.



IMPRESSION:

1. No acute brain findings.

2. Dependent mucus in the sphenoid sinuses may significant sinusitis.



Thank you for allowing us to participate in the care of your patient.

Dictated and Authenticated by: Chicho Galloway MD

10/18/2019 4:56 AM Central Time ([place] & [place])







FINAL REPORT 



CT HEAD NONCONTRAST 10/18/2019 PERFORMED ON AN EMERGENCY BASIS AT 0439 HOURS:



HISTORY: Headache. Dizziness.



COMPARISON: 12/10/2017.



FINDINGS: Agree with the preliminary report by Dr. Galloway from [facility]. No acute intracranial abnormali
ties are demonstrated. Fluid layering within the dependent portion of the sphenoid sinus and

mucosal thickening are similar to the previous exam.



Code QA.



Transcribed Date/Time: 10/18/2019 8:40 AM



Reported By: HERLINDA Coleman 

Electronically Signed:  10/18/2019 11:10 AM

## 2019-10-19 LAB
ALBUMIN SERPL BCG-MCNC: 3.5 G/DL (ref 3.4–4.8)
ALP SERPL-CCNC: 70 U/L (ref 40–110)
ALT SERPL W P-5'-P-CCNC: 13 U/L (ref 8–55)
ANION GAP SERPL CALC-SCNC: 15 MMOL/L (ref 10–20)
AST SERPL-CCNC: 14 U/L (ref 5–34)
BILIRUB SERPL-MCNC: 0.6 MG/DL (ref 0.2–1.2)
BUN SERPL-MCNC: 20 MG/DL (ref 9.8–20.1)
CALCIUM SERPL-MCNC: 8.8 MG/DL (ref 7.8–10.44)
CHLORIDE SERPL-SCNC: 104 MMOL/L (ref 98–107)
CO2 SERPL-SCNC: 21 MMOL/L (ref 23–31)
CREAT CL PREDICTED SERPL C-G-VRATE: 55 ML/MIN (ref 70–130)
GLOBULIN SER CALC-MCNC: 3 G/DL (ref 2.4–3.5)
GLUCOSE SERPL-MCNC: 205 MG/DL (ref 83–110)
HGB BLD-MCNC: 11 G/DL (ref 12–16)
MCH RBC QN AUTO: 30.6 PG (ref 27–31)
MCV RBC AUTO: 94 FL (ref 78–98)
MDIFF COMPLETE?: YES
PLATELET # BLD AUTO: 156 THOU/UL (ref 130–400)
POTASSIUM SERPL-SCNC: 4.1 MMOL/L (ref 3.5–5.1)
RBC # BLD AUTO: 3.59 MILL/UL (ref 4.2–5.4)
SODIUM SERPL-SCNC: 136 MMOL/L (ref 136–145)
WBC # BLD AUTO: 12.2 THOU/UL (ref 4.8–10.8)

## 2019-10-19 RX ADMIN — INSULIN GLARGINE SCH UNITS: 100 INJECTION, SOLUTION SUBCUTANEOUS at 20:10

## 2019-10-19 RX ADMIN — HYDROCODONE BITARTRATE AND ACETAMINOPHEN PRN TAB: 10; 325 TABLET ORAL at 13:13

## 2019-10-19 RX ADMIN — INSULIN GLARGINE SCH UNITS: 100 INJECTION, SOLUTION SUBCUTANEOUS at 09:14

## 2019-10-19 RX ADMIN — Medication SCH: at 09:15

## 2019-10-19 RX ADMIN — HYDROCODONE BITARTRATE AND ACETAMINOPHEN PRN TAB: 10; 325 TABLET ORAL at 20:05

## 2019-10-19 RX ADMIN — HYDROCODONE BITARTRATE AND ACETAMINOPHEN PRN TAB: 10; 325 TABLET ORAL at 07:23

## 2019-10-19 RX ADMIN — Medication SCH ML: at 20:07

## 2019-10-19 NOTE — HP
HISTORY OF PRESENT ILLNESS:  Ms. Frye is a pleasant 74-year-old white female, who

presented to the emergency room this morning complaining of lightheadedness.  She

states she awakened about 3 o'clock in the morning and felt terrible, so she came to

the emergency room.  She states she just does not feel well.  Her equilibrium was

off.  She had to hold on to wall to walk.  She was seen and evaluated by Dr. Richardson.

 She was found to have a significant urinary tract infection with increased white

count to 23,000 and her creatinine did increase over the last 5 days for her to 1.8.

 Blood cultures were drawn and urine cultures were drawn.  The patient was started

on vancomycin and Zosyn.  She was admitted to the hospital. 



PAST MEDICAL HISTORY:  Significant for;

1. Diabetes type 2 with diabetic nephropathy.

2. Hypertension.

3. Arthritis.

4. Urinary tract infection in the past.

5. GERD without esophagitis.

6. Major depressive disorder.

7. Acute pyelonephritis in the past.

8. Sepsis.

9. Fibromyalgia.

10. Anxiety.

11. Claudication.

12. History of hypovitaminosis.

13. Dyspepsia.

14. Osteopenia.

15. Scoliosis.

16. Gallstones.



PAST SURGICAL HISTORY:  

1. Cholecystectomy.

2. Hysterectomy with one ovary removed.

3. Endometriosis.

4. Cystoscoped by Dr. Silver.

5. Herniated disk.

6. Vaginal inclusion cyst.

7. Cataract surgery.

8. Laser surgery.



FAMILY HISTORY:  Reveals the patient's father  at age 82 of pancreatic cancer

and diagnosed with diabetes. 



The patient's mother  at age 52 of breast cancer diagnosed with diabetes and

cancer. 



The patient has 1 sister with Parkinson disease and one sister passed away with

Alzheimer.  The patient has an another sister who has Alzheimer disease and

diagnosis along with diabetes and heart disease.  The patient has 2 healthy sons. 



SOCIAL HISTORY:  Reveals the patient continues to smoke.  She smoked for the last 57

years about a pack a day or little less.  She is not interested in stopping at this

time, although we have encouraged her again to stop smoking.  She has tried cold

turkey, Chantix, nicotine patch.  She does not drink alcohol.  Does not use

recreational drugs.  Unfortunately, her staff has passed away about a year ago. 



ALLERGIES:  THE PATIENT IS ALLERGIC TO;

1. IBUPROFEN.

2. AVELOX.

3. NAPROXEN.

4. NSAIDS.

5. CIPRO.



REVIEW OF SYSTEMS:  CONSTITUTIONAL:  The patient states she has felt like she had

fever and some chills, but no night sweats.  She has not lost weight.  Her appetite

is not good at this time. 

HEENT:  She has no change in vision.  She has no change in hearing, although she has

had congestion allergies. 

CARDIOVASCULAR:  She complains of no chest pain or palpitations.  Denies irregular

heartbeat at this time.  Occasional shortness of breath with exertion, but no lower

extremity edema. 

PULMONARY:  She denies shortness of breath, dyspnea on exertion, cough, or wheezing. 

GI:  She denies nausea, or vomiting, although she states she just has no appetite at

this time.  Denies any heartburn, or indigestion.  Denies diarrhea.  Also denies

constipation or any change in bowel habits. 



She denies any frequency or urgency, although she did have a urinary tract

infection.  She has had previous UTIs and seen her urologist for that.  She states

she is somewhat achy all over, but denies muscle cramps.  She denies any rashes or

skin lesions or itching. 

NEUROLOGIC:  She states she feels a little lightheaded.  No significant headache.

No numbness or paresthesias.  No vision changes or hearing changes.  She does have

depression, anxiety disorder secondary to the loss of her  recently.  She

also has loss of a sister.  She does have quite a bit of mood swings, but no

suicidal ideation.  No alcohol abuse.  No hallucinations. 



PHYSICAL EXAMINATION:

GENERAL:  This is a well-developed, well-nourished 74-year-old white female, who

says she still feels poorly.  She has not had much of an appetite, but she has had

some IV fluids. 

HEENT:  Reveals normocephalic and nontraumatic cranium.  Pupils are equally round

and reactive.  Extraocular movements are intact.  Nose and throat are slightly dry. 

NECK:  Supple without masses, nodes, or bruits. 

CHEST:  Clear to auscultation.  No rales, no rhonchi, no wheezes.  No cough is

noted. 

HEART:  Reveals a regular rate and rhythm without murmurs, gallops, or rubs. 

ABDOMEN:  Slightly obese, soft, nontender without organomegaly.  Normal bowel sounds

are noted.  No rebound or guarding is noted. 

:  Deferred. 

EXTREMITIES:  Reveal no clubbing, cyanosis, or edema.  The patient has somewhat of a

flat affect.  Otherwise, is back to her normal. 



IMPRESSION:  At this time, 

1. Dehydration.

2. Urinary tract infection.

3. Possible sepsis.

4. Diabetes type 2.

5. Hypertension.

6. Hyperlipidemia.

7. Anxiety and depressive disorder.

8. Arthritis pains.

9. Gastroesophageal reflux disease.

10. Leaky bladder.

11. Allergic rhinitis.



PLAN:  

1. The patient is admitted to the hospital.

2. We will await her blood cultures and urine cultures.

3. She has been started on vancomycin, and Zosyn.

4. Continue present medications.

5. Continue home medications.

6. Repeat labs tomorrow.







Job ID:  [id number]

## 2019-10-20 RX ADMIN — HYDROCODONE BITARTRATE AND ACETAMINOPHEN PRN TAB: 10; 325 TABLET ORAL at 05:51

## 2019-10-20 RX ADMIN — HYDROCODONE BITARTRATE AND ACETAMINOPHEN PRN TAB: 10; 325 TABLET ORAL at 17:47

## 2019-10-20 RX ADMIN — INSULIN GLARGINE SCH UNITS: 100 INJECTION, SOLUTION SUBCUTANEOUS at 21:59

## 2019-10-20 RX ADMIN — Medication SCH: at 09:20

## 2019-10-20 RX ADMIN — Medication SCH: at 22:45

## 2019-10-20 RX ADMIN — HYDROCODONE BITARTRATE AND ACETAMINOPHEN PRN TAB: 10; 325 TABLET ORAL at 11:53

## 2019-10-20 RX ADMIN — INSULIN GLARGINE SCH UNITS: 100 INJECTION, SOLUTION SUBCUTANEOUS at 09:21

## 2019-10-20 RX ADMIN — HYDROCODONE BITARTRATE AND ACETAMINOPHEN PRN TAB: 10; 325 TABLET ORAL at 22:06

## 2019-10-20 NOTE — PRG
DATE OF SERVICE:  10/20/2019



SUBJECTIVE:  The patient feels better today with increased strength, but still

having persistent right earache and face pain. 



OBJECTIVE:  HEENT:  Right ear is dull with fluid.  No real erythema. 

LUNGS:  Clear. 

CARDIAC:  Showed regular rhythm. 

VITAL SIGNS:  Show temperature is 98, pulse 75, respirations 18, O2 sats 94% on room

air, and blood pressure is 119/56. 



LABORATORY DATA:  Accu-Cheks improved to 134 to 188.  White count improved to

12,200, hematocrit 33, and hemoglobin 11.  Microbiology shows the above-mentioned

resistant Escherichia coli and presumptive Strep pneumoniae bacteremia. 



ASSESSMENT:  

1. Resistant Escherichia coli urinary tract infection.

2. Presumptive Streptococcus pneumoniae bacteremia and most likely sinusitis.



PLAN:  Continue meropenem as the patient appears to be responding until cultures

return.  Discussed the need with the patient to probably stay for full 7 days for

resistant Escherichia coli urinary tract infection. 







Job ID:  [id number]

## 2019-10-20 NOTE — PRG
DATE OF SERVICE:  10/19/2019



The patient of Rufino Olivarez MD.



SUBJECTIVE:  The patient feels well, lying in the bed.  States that she is still

dizzy and is complaining mainly of severe right earache and headache.  She has had

no further fever or chills since she has been on the antibiotics of vancomycin and

Zosyn.  She is having minimal cough, no shortness of breath, chest pain, still

feeling very weak, however. 



OBJECTIVE:  VITAL SIGNS:  Her blood pressure is 125/62, temperature is 97, pulse 73,

respirations 18, O2 sats 93% on room air. 

LUNGS:  Show a few rhonchi. 

CARDIAC:  Shows regular rhythm. 

ABDOMEN:  Soft, nontender. 

SKIN/EXTREMITIES:  Showed no edema.



LABORATORY DATA:  Urine culture is showing E coli, which is resistant, however, to

all oral antibiotics, but sensitive to the meropenem she is on.  Blood culture,

however, is showing presumptive Strep pneumoniae on the nucleic acid test with

sensitivities to follow. 



CT of the head earlier had shown sphenoid sinusitis and mucosal thickening.



ASSESSMENT:  

1. Sinusitis with pneumococcal bacteremia.

2. Resistant Escherichia coli urinary tract infection.



PLAN:  Continue meropenem.  Await results of cultures for definitive antibiotic

therapy. 







Job ID:  [id number]

## 2019-10-21 ENCOUNTER — HOSPITAL ENCOUNTER (INPATIENT)
Dept: HOSPITAL 18 - NAV ACUTE | Age: 74
LOS: 4 days | Discharge: HOME | DRG: 872 | End: 2019-10-25
Attending: FAMILY MEDICINE | Admitting: FAMILY MEDICINE
Payer: MEDICARE

## 2019-10-21 VITALS — TEMPERATURE: 98.9 F | SYSTOLIC BLOOD PRESSURE: 134 MMHG | DIASTOLIC BLOOD PRESSURE: 61 MMHG

## 2019-10-21 VITALS — BODY MASS INDEX: 29.4 KG/M2

## 2019-10-21 DIAGNOSIS — K21.9: ICD-10-CM

## 2019-10-21 DIAGNOSIS — N39.0: ICD-10-CM

## 2019-10-21 DIAGNOSIS — R53.1: ICD-10-CM

## 2019-10-21 DIAGNOSIS — Z90.49: ICD-10-CM

## 2019-10-21 DIAGNOSIS — F17.210: ICD-10-CM

## 2019-10-21 DIAGNOSIS — J30.9: ICD-10-CM

## 2019-10-21 DIAGNOSIS — E78.5: ICD-10-CM

## 2019-10-21 DIAGNOSIS — E11.40: ICD-10-CM

## 2019-10-21 DIAGNOSIS — M79.7: ICD-10-CM

## 2019-10-21 DIAGNOSIS — F41.9: ICD-10-CM

## 2019-10-21 DIAGNOSIS — A41.9: Primary | ICD-10-CM

## 2019-10-21 DIAGNOSIS — E86.0: ICD-10-CM

## 2019-10-21 DIAGNOSIS — B96.20: ICD-10-CM

## 2019-10-21 DIAGNOSIS — Z90.710: ICD-10-CM

## 2019-10-21 DIAGNOSIS — I10: ICD-10-CM

## 2019-10-21 DIAGNOSIS — F32.9: ICD-10-CM

## 2019-10-21 DIAGNOSIS — E11.21: ICD-10-CM

## 2019-10-21 DIAGNOSIS — Z88.8: ICD-10-CM

## 2019-10-21 LAB
ALBUMIN SERPL BCG-MCNC: 3.4 G/DL (ref 3.4–4.8)
ALP SERPL-CCNC: 87 U/L (ref 40–110)
ALT SERPL W P-5'-P-CCNC: 15 U/L (ref 8–55)
ANION GAP SERPL CALC-SCNC: 16 MMOL/L (ref 10–20)
AST SERPL-CCNC: 17 U/L (ref 5–34)
BASOPHILS # BLD AUTO: 0.1 THOU/UL (ref 0–0.2)
BASOPHILS NFR BLD AUTO: 0.8 % (ref 0–1)
BILIRUB SERPL-MCNC: 0.5 MG/DL (ref 0.2–1.2)
BUN SERPL-MCNC: 15 MG/DL (ref 9.8–20.1)
CALCIUM SERPL-MCNC: 9.1 MG/DL (ref 7.8–10.44)
CHLORIDE SERPL-SCNC: 104 MMOL/L (ref 98–107)
CO2 SERPL-SCNC: 22 MMOL/L (ref 23–31)
CREAT CL PREDICTED SERPL C-G-VRATE: 56 ML/MIN (ref 70–130)
EOSINOPHIL # BLD AUTO: 0.1 THOU/UL (ref 0–0.7)
EOSINOPHIL NFR BLD AUTO: 0.7 % (ref 0–10)
GLOBULIN SER CALC-MCNC: 3.3 G/DL (ref 2.4–3.5)
GLUCOSE SERPL-MCNC: 165 MG/DL (ref 83–110)
HGB BLD-MCNC: 11 G/DL (ref 12–16)
LYMPHOCYTES # BLD AUTO: 1.6 THOU/UL (ref 1.2–3.4)
LYMPHOCYTES NFR BLD AUTO: 18.7 % (ref 21–51)
MCH RBC QN AUTO: 29.8 PG (ref 27–31)
MCV RBC AUTO: 94 FL (ref 78–98)
MONOCYTES # BLD AUTO: 0.7 THOU/UL (ref 0.11–0.59)
MONOCYTES NFR BLD AUTO: 8.2 % (ref 0–10)
NEUTROPHILS # BLD AUTO: 6.3 THOU/UL (ref 1.4–6.5)
NEUTROPHILS NFR BLD AUTO: 71.6 % (ref 42–75)
PLATELET # BLD AUTO: 199 THOU/UL (ref 130–400)
POTASSIUM SERPL-SCNC: 4 MMOL/L (ref 3.5–5.1)
RBC # BLD AUTO: 3.7 MILL/UL (ref 4.2–5.4)
SODIUM SERPL-SCNC: 138 MMOL/L (ref 136–145)
WBC # BLD AUTO: 8.7 THOU/UL (ref 4.8–10.8)
WBC UR QL AUTO: (no result) HPF (ref 0–3)

## 2019-10-21 PROCEDURE — 80053 COMPREHEN METABOLIC PANEL: CPT

## 2019-10-21 PROCEDURE — 36416 COLLJ CAPILLARY BLOOD SPEC: CPT

## 2019-10-21 PROCEDURE — 85025 COMPLETE CBC W/AUTO DIFF WBC: CPT

## 2019-10-21 RX ADMIN — HYDROCODONE BITARTRATE AND ACETAMINOPHEN PRN TAB: 10; 325 TABLET ORAL at 17:20

## 2019-10-21 RX ADMIN — Medication SCH ML: at 09:15

## 2019-10-21 RX ADMIN — HYDROCODONE BITARTRATE AND ACETAMINOPHEN PRN TAB: 10; 325 TABLET ORAL at 09:56

## 2019-10-21 RX ADMIN — HYDROCODONE BITARTRATE AND ACETAMINOPHEN PRN TAB: 10; 325 TABLET ORAL at 23:52

## 2019-10-21 RX ADMIN — INSULIN GLARGINE SCH UNITS: 100 INJECTION, SOLUTION SUBCUTANEOUS at 09:27

## 2019-10-21 NOTE — PRG
DATE OF SERVICE:  10/21/2019



SUBJECTIVE:  Ms. Frye is a very pleasant 74-year-old white female, who went to the

emergency room with lightheadedness.  She was found to have a significant urinary

tract infection.  Her white count was noted to be 23,000 and creatinine went up to

1.8.  Blood cultures were drawn and urine cultures were drawn. 



Her urine culture is growing E coli, which is sensitive to piperacillin or Zosyn,

which she is presently on, but also oral nitrofurantoin. 



In the past, she has had nitrofurantoin for the same bacteria and was not able to

tolerate it. 



Today, she states she feels a little bit better, but still feels kind of puny and

not very strong.  Most likely if she qualifies, we will transfer her to swing bed

with physical therapy and occupational therapy. 



LABORATORY DATA:  Today reveals a white count is finally down to 8700 with

hemoglobin 11.0, hematocrit 34.8, and platelet count of 199,000.  Chemistry reveals

sodium 138, potassium 4.0, chloride 104, carbon dioxide 22 with a BUN of 15 and

creatinine of 1.12.  Certainly, it is much better than 1.61 when she came in. 



OBJECTIVE:  VITAL SIGNS:  Today reveal blood pressure 134/61, pulse 82, respirations

18, O2 saturation 95% on room air, and T-max 98.9. 

GENERAL:  On physical examination, this is a well-developed, well-nourished white

female, in no apparent distress at this time. 

HEENT:  Reveals normocephalic and nontraumatic cranium.  Pupils are equally round

and reactive.  Extraocular movements are intact.  Nose and throat are slightly dry. 

NECK:  Supple without masses, nodes, or bruits. 

CHEST:  Clear to auscultation without rales, rhonchi, or wheezes. 

HEART:  Reveals a regular rate and rhythm without murmurs, gallops, or rubs. 

ABDOMEN:  Soft, nontender without organomegaly.  Normal bowel sounds are noted.  No

rebound or guarding is noted. 

:  Deferred. 

EXTREMITIES:  Reveal no clubbing, cyanosis, or edema. 

NEUROLOGIC:  The patient is neurologically intact.  Cranial nerves 2 through 12 are

intact.  The patient is oriented to person, place, and time. 



The patient states she feels somewhat better, but not back to her normal. 



We will continue her present antibiotics since she is sensitive and most likely move

her to swing bed and get physical therapy and occupational therapy to try to make

her stronger. 



ASSESSMENT:  

1. Escherichia coli resistant type urinary tract infection, greater than 100,000,

sensitive to Zosyn, nitrofurantoin, gentamicin, cefoxitin, and amikacin, resistant

to all others. 

2. Diabetes type 2 with diabetic nephropathy and diabetic neuropathy.

3. Hypertension.

4. Gastroesophageal reflux disease without esophagitis.

5. Major depressive disorder.

6. Osteopenia.

7. Generalized weakness.



PLAN:  

1. Continue present antibiotics.

2. Continue to monitor the patient's blood pressure closely.

3. Monitor the patient's diabetes with Accu-Cheks a.c. and at bedtime.

4. Encourage the patient to eat, for which her appetite is still very poor at this

time. 

5. Stress ulcer prophylaxis.

6. Decubitus precautions.

7. DVT prophylaxis per Primary Service.

8. We will get a consult with Physical Therapy and Occupational Therapy.







Job ID:  [id number]

## 2019-10-22 RX ADMIN — INSULIN GLARGINE SCH UNITS: 100 INJECTION, SOLUTION SUBCUTANEOUS at 21:15

## 2019-10-22 RX ADMIN — HYDROCODONE BITARTRATE AND ACETAMINOPHEN PRN TAB: 10; 325 TABLET ORAL at 23:17

## 2019-10-22 RX ADMIN — INSULIN GLARGINE SCH UNITS: 100 INJECTION, SOLUTION SUBCUTANEOUS at 08:17

## 2019-10-22 RX ADMIN — HYDROCODONE BITARTRATE AND ACETAMINOPHEN PRN TAB: 10; 325 TABLET ORAL at 16:38

## 2019-10-22 RX ADMIN — HYDROCODONE BITARTRATE AND ACETAMINOPHEN PRN TAB: 10; 325 TABLET ORAL at 08:13

## 2019-10-22 NOTE — HP
DATE OF TRANSFER:  10/21/2019.



HISTORY OF PRESENT ILLNESS:  Ms. Frye is a well-developed, well-nourished, very

pleasant, 74-year-old white female, who presented to the emergency room complaining

of lightheadedness.  It was about 3 in the morning.  She was evaluated by Dr. Richardson

and found to have significant urinary tract infection with an increased white count

of 23,000.  Her creatinine was up to 1.8, and she typically runs low 1.1 or 1.2.

Blood cultures were drawn and urine cultures were drawn.  She was admitted to the

hospital and started on Zosyn. 



She was in the hospital for 3 days in the acute care, and actually, she is much

improved.  She did grow out E coli, which was sensitive to Zosyn.  The only oral

antibiotic was nitrofurantoin, she has had difficulty with that.  We had a long

discussion yesterday and felt that she needed to remain in the hospital since she is

still feeling poorly and not completely back to her normal self yet.  She felt weak

and just felt somewhat nauseous.  She has not been eating very well.  It was

determined that we would transfer her to a swing bed, where she can get continued

physical therapy and continue her IV antibiotics. 



PAST MEDICAL HISTORY:  

1. Diabetes type 2 with diabetic neuropathy.

2. Diabetes 2 with diabetic nephropathy.

3. Hypertension.

4. Urinary tract infection, which has been recurrent.

5. Arthritis.

6. GERD without esophagitis.

7. Major depressive disorder.

8. Past history of acute pyelonephritis.

9. Sepsis in past history.

10. Fibromyalgia.

11. Anxiety.

12. Claudication.

13. History of hypovitaminosis.

14. Dyspepsia.

15. Osteopenia.

16. Scoliosis.

17. Gallstones.



PAST SURGERY HISTORY:  

1. Cholecystectomy.

2. Hysterectomy with one ovary removed.

3. Endometriosis.

4. Cystoscope by Dr. Silver.

5. Herniated disks.

6. Vaginal inclusion cyst.

7. Cataract surgery.

8. Laser surgery.



FAMILY HISTORY:  Reveals the patient's father  at age 82 of pancreatic cancer,

diagnosed with diabetes.  The patient's mother  at age 52 of breast cancer,

diagnosed with diabetes and cancer.  The patient had one sister with Parkinson

disease and one sister passed away with Alzheimer disease.  The patient has another

sister who does have Alzheimer disease and diabetes and heart disease.  The patient

has two healthy sons. 



SOCIAL HISTORY:  Reveals the patient continues to smoke for last the 57 years about

a pack a day.  She is not interested in stopping at this time.  She in the hospital

has not been able to smoke, so that is a good thing.  She does not drink any

alcohol.  She does not use recreational drugs.  She unfortunately lost her 

about a year ago. 



ALLERGIES:  REVEALS THE PATIENT IS ALLERGIC TO IBUPROFEN, AVELOX, NAPROSYN, NSAIDS,

AND CIPRO. 



REVIEW OF SYSTEMS:  GENERAL:  The patient states she feels a bit better.  She still

has occasional chills, but no fever, no night sweats.  Her appetite is still very

poor. 

HEENT:  She denies any vision changes or hearing changes.  Denies any sore throat,

but she still continues to have some allergy congestion. 

CARDIOVASCULAR:  She complains of no chest pain or palpitations.  She just feels

tired. 

RESPIRATORY:  She has some shortness of breath with exertion at this time.  Denies

any cough, cold, or congestion at this time. 

GASTROINTESTINAL:  Denies nausea, vomiting, or GI problems.  She has poor appetite.

Denies heartburn or indigestion.  Denies diarrhea or constipation. 

GENITOURINARY:  Denies any frequency or urgency, although she did have some burning

with her previous urinary tract infection.  She is followed by a urologist, I

believe Dr. Puga. 

MUSCULOSKELETAL:  Denies any muscle cramps. 

SKIN:  Denies any dermatological skin lesions or itching. 

NEUROLOGIC:  She is oriented to person, place, and time.  She has no vision or

hearing changes.  Cranial nerves basically 2 through 12 are intact.  She just is

somewhat weak. 



PHYSICAL EXAMINATION:

GENERAL:  This is a well-developed, well-nourished, 74-year-old white female, who

still states she does not feel up to her normal.  She has a poor appetite.  She is

not drinking fluids yet. 

HEENT:  Reveals normocephalic and nontraumatic cranium.  Pupils are equal, round,

and reactive.  Extraocular movements are intact.  Nose and throat are still slightly

dry. 

NECK:  Supple without masses, nodes, or bruits. 

CHEST:  Clear to auscultation.  No rales, rhonchi, wheezes, or cough is heard. 

HEART:  Reveals a regular rate and rhythm without murmurs, gallops, or rubs. 

ABDOMEN:  Soft and nontender without organomegaly.  Normal bowel sounds are noted.

No rebound or guarding is noted. 

:  Deferred. 

EXTREMITIES:  Reveal no clubbing, cyanosis, or edema.  The patient still has a flat

affect and still is not interested in eating much. 



IMPRESSION:  

1. Urinary tract infection, presently on Zosyn.

2. Dehydration.

3. Possible sepsis, which is much improved.

4. Diabetes, type 2, stable.

5. Hypertension.

6. Hyperlipidemia.

7. Anxiety and depressive disorder.

8. Arthritic pains.

9. Gastroesophageal reflux.

10. Leaky bladder.

11. Allergic rhinitis.



PLAN:  

1. The patient will be transferred to swing bed at this time.

2. We will continue her Zosyn for full 7 days.

3. Her urine cultures reveal she is growing E coli which is sensitive to Zosyn.

4. Continue other present home medications.

5. Continue to follow labs tomorrow.

6. The patient's IV came out and she is resistant about getting back in, but I did

talk with her and we will get it started.  If that does not work, we will send her

for a PICC line. 







Job ID:  [id number]

## 2019-10-23 RX ADMIN — INSULIN GLARGINE SCH UNITS: 100 INJECTION, SOLUTION SUBCUTANEOUS at 08:46

## 2019-10-23 RX ADMIN — HYDROCODONE BITARTRATE AND ACETAMINOPHEN PRN TAB: 10; 325 TABLET ORAL at 08:55

## 2019-10-23 RX ADMIN — HYDROCODONE BITARTRATE AND ACETAMINOPHEN PRN TAB: 10; 325 TABLET ORAL at 20:56

## 2019-10-23 RX ADMIN — INSULIN GLARGINE SCH UNITS: 100 INJECTION, SOLUTION SUBCUTANEOUS at 20:56

## 2019-10-23 NOTE — PQF
[employer] Winform ViewerSUNNY POLANCO             
                               SONI MIMS MD

[id number]                                                             

[id number]                             

                                   

CLINICAL DOCUMENTATION CLARIFICATION FORM:  POST DISCHARGE



Addendum to original discharge summary date:  __________________________________
____



Late entry note date:  _________________________________________________________
__











DATE:    10/23/2019                                                ATTN:      
SONI MIMS MD





Please exercise your independent, professional judgment in responding to the 
clarification form. 

Clinical indicators are provided on the bottom of this form for your review





Please check appropriate box(s) to clarify if the following diagnosis has been 
ruled in or ruled out:

__SEPSIS________(CDI/Coding list diagnosis here)



[  ] Ruled in diagnosis

     [  ] Continue to treat        [  ] Resolved

[  ] Ruled out diagnosis

[  ] Cannot rule out diagnosis

[  ] Other diagnosis ___________

[  ] Unable to determine



In addition, please specify:

Present on Admission (POA):  [  ] Yes             [  ] No             [  ] 
Unable to determine



For continuity of documentation, please document condition throughout progress 
notes and discharge summary.  Thank You.



CLINICAL INDICATORS - SIGNS / SYMPTOMS / LABS

Sepsis -  Emergency Room visit pg#10

WBC level 23.1 on 10//18 , 12.2 on 10/19 - Documented in Laboratory results

Possible sepsis - Documented in H&P on 10/18 by SONI MIMS MD

Temp 100.5 - Documented in Vital & Signs

/58 on 10/20 - Documented in Vital & Signs





RISK FACTORS

UTI E.coli  - Documented in H&P on 10/18 by SONI MIMS MD

Presumptive streptococcus pneumoniae bacteremia and most likely sinusitis - 
Documented in PNs on 10/18 by Conrad Freeman



TREATMENTS

Started vancomycin and Zosyn - Documented in H&P on 10/18 by SONI MIMS MD

Continue present medication



[employer] Reports Winform ViewerRepeat lab tomorrow







(This form is maintained as a part of the permanent medical record)

2015 [facility].  All Rights Reserved

[email or web address]    [not 
provided]

                                                              



 



MTDD

## 2019-10-23 NOTE — PRG
DATE OF SERVICE:  10/23/2019



SUBJECTIVE:  Ms. Frye is a 74-year-old white female, presented to the emergency

room complaining of lightheadedness.  She was evaluated by the ER physician, found

to have a white count of 23,000 and urinary tract infection.  She grew out E coli

sensitive to Zosyn.  She will finish her Zosyn after her 10 o'clock dose on Friday

morning. 



She was hospitalized for three days in acute care, now transferred to Swing bed.

She will finish her Zosyn after her last dose is Friday morning at 10:25. 



The patient states she is feeling better, and wants to go home. 



I told her she has to finish her IV antibiotics because she has had resistant

urinary tract infections.  She is reluctant, but she said she will stay till Friday

morning. 



OBJECTIVE:  GENERAL:  This is a well-developed, well-nourished, very pleasant white

female, in no apparent distress at this time. 

HEENT:  Reveals normocephalic and nontraumatic cranium.  The pupils are equal,

round, and reactive.  Extraocular movements are intact.  Nose and throat are

slightly dry, but clear. 

NECK:  Supple without masses, nodes, or bruits. 

CHEST:  Clear to auscultation.  No rales, rhonchi, wheezes, or cough is heard. 

HEART:  Reveals a regular rate and rhythm without murmurs, gallops, or rubs. 

ABDOMEN:  Soft, obese, nontender without organomegaly.  Normal bowel sounds are

noted.  No rebound or guarding is noted. 

:  Deferred. 

EXTREMITIES:  Reveal no clubbing, cyanosis, or edema.  The patient still states she

feels better and she is wanting to go home soon. 

NEUROLOGIC:  Her affect is much more back to appropriate.



IMPRESSION:  

1. Urinary tract infection which grew out Escherichia coli sensitive to Zosyn.

2. Dehydration, much improved.

3. Possible sepsis, much improved.

4. Diabetes type 2, stable.

5. Hypertension.

6. Hyperlipidemia.

7. Anxiety and depressive disorder.

8. Arthritic pains.

9. Gastroesophageal reflux disease.

10. Leaky bladder with incontinence.

11. Allergic rhinitis.



PLAN:  

1. The patient is transferred to Swing bed.

2. The patient will continue her Zosyn which finishes at 10:25 in the morning on

Friday morning. 

3. The patient will continue other present medications.

4. The patient will have lab work tomorrow.

5. If the patient's IV comes out, she will need to get it started so she can get her

last doses. 







Job ID:  [id number]

## 2019-10-24 LAB
ALBUMIN SERPL BCG-MCNC: 3.3 G/DL (ref 3.4–4.8)
ALP SERPL-CCNC: 65 U/L (ref 40–110)
ALT SERPL W P-5'-P-CCNC: 12 U/L (ref 8–55)
ANION GAP SERPL CALC-SCNC: 15 MMOL/L (ref 10–20)
AST SERPL-CCNC: 12 U/L (ref 5–34)
BASOPHILS # BLD AUTO: 0.1 THOU/UL (ref 0–0.2)
BASOPHILS NFR BLD AUTO: 1.4 % (ref 0–1)
BILIRUB SERPL-MCNC: 0.2 MG/DL (ref 0.2–1.2)
BUN SERPL-MCNC: 15 MG/DL (ref 9.8–20.1)
CALCIUM SERPL-MCNC: 9.2 MG/DL (ref 7.8–10.44)
CHLORIDE SERPL-SCNC: 108 MMOL/L (ref 98–107)
CO2 SERPL-SCNC: 24 MMOL/L (ref 23–31)
CREAT CL PREDICTED SERPL C-G-VRATE: 56 ML/MIN (ref 70–130)
EOSINOPHIL # BLD AUTO: 0.1 THOU/UL (ref 0–0.7)
EOSINOPHIL NFR BLD AUTO: 1.4 % (ref 0–10)
GLOBULIN SER CALC-MCNC: 2.9 G/DL (ref 2.4–3.5)
GLUCOSE SERPL-MCNC: 109 MG/DL (ref 83–110)
HGB BLD-MCNC: 10.3 G/DL (ref 12–16)
LYMPHOCYTES # BLD AUTO: 2.1 THOU/UL (ref 1.2–3.4)
LYMPHOCYTES NFR BLD AUTO: 29.8 % (ref 21–51)
MCH RBC QN AUTO: 31.1 PG (ref 27–31)
MCV RBC AUTO: 95.7 FL (ref 78–98)
MONOCYTES # BLD AUTO: 0.5 THOU/UL (ref 0.11–0.59)
MONOCYTES NFR BLD AUTO: 6.7 % (ref 0–10)
NEUTROPHILS # BLD AUTO: 4.2 THOU/UL (ref 1.4–6.5)
NEUTROPHILS NFR BLD AUTO: 60.7 % (ref 42–75)
PLATELET # BLD AUTO: 288 THOU/UL (ref 130–400)
POTASSIUM SERPL-SCNC: 4 MMOL/L (ref 3.5–5.1)
RBC # BLD AUTO: 3.32 MILL/UL (ref 4.2–5.4)
SODIUM SERPL-SCNC: 143 MMOL/L (ref 136–145)
WBC # BLD AUTO: 6.9 THOU/UL (ref 4.8–10.8)

## 2019-10-24 RX ADMIN — HYDROCODONE BITARTRATE AND ACETAMINOPHEN PRN TAB: 10; 325 TABLET ORAL at 23:44

## 2019-10-24 RX ADMIN — INSULIN GLARGINE SCH UNITS: 100 INJECTION, SOLUTION SUBCUTANEOUS at 09:18

## 2019-10-24 RX ADMIN — HYDROCODONE BITARTRATE AND ACETAMINOPHEN PRN TAB: 10; 325 TABLET ORAL at 17:45

## 2019-10-24 RX ADMIN — HYDROCODONE BITARTRATE AND ACETAMINOPHEN PRN TAB: 10; 325 TABLET ORAL at 10:06

## 2019-10-24 RX ADMIN — INSULIN GLARGINE SCH UNITS: 100 INJECTION, SOLUTION SUBCUTANEOUS at 20:18

## 2019-10-24 NOTE — PRG
DATE OF SERVICE:  10/24/2019



SUBJECTIVE:  Ms. Frye is a 74-year-old white female, presented to the emergency

room with lightheadedness.  She was found by the ER doctor to have a white count of

23,000 and urinary tract infection.  She grew out E. coli, sensitive to Zosyn.  She

will finish the Zosyn tomorrow morning with her last dose. 



She states she is feeling much better and states she will be ready to go home

tomorrow. 



OBJECTIVE:  VITAL SIGNS:  Reveal blood pressure 169/59, pulse 76 to 80, respirations

20, O2 saturation 95% on room air, T-max 96.7. 

GENERAL:  This is a well-developed, well-nourished, pleasant, white female, in no

apparent distress at this time. 

HEENT:  Reveals normocephalic and nontraumatic cranium.  Pupils are equally round

and reactive.  Extraocular movements are intact.  Nose and throat are slightly dry. 

NECK:  Supple without masses, nodes, or bruits. 

CHEST:  Clear to auscultation.  No rales, rhonchi, wheezes, or cough is noted. 

HEART:  Reveals a regular rate and rhythm without murmurs, gallops, or rubs. 

ABDOMEN:  Slightly obese, soft, and nontender without organomegaly.  Normal bowel

sounds are noted in all 4 quadrants.  No rebound or guarding is noted. 

:  Deferred. 

EXTREMITIES:  Reveal no clubbing, cyanosis, or edema.  The patient states she still

feels better and wants to go home tomorrow. 

NEUROLOGIC:  She is back to her normal.



IMPRESSION:  

1. Urinary tract infection, which grew out Escherichia coli, sensitive to Zosyn.

2. Dehydration, much improved.

3. Possible sepsis, resolved.

4. Diabetes, type 2, stable.

5. Hypertension.

6. Hyperlipidemia.

7. Anxiety and depressive disorder.

8. Arthritic pains.

9. Gastroesophageal reflux disease.

10. Leaky bladder with incontinence.

11. Allergic rhinitis.



PLAN:  

1. The patient is on Zosyn, will finish her last dose starting tomorrow morning at

10. 

2. The patient will be discharged after that.

3. Continue present medications.

4. The patient will continue to have lab work as an outpatient when she sees me next

week. 

5. The patient will be discharged tomorrow.







Job ID:  [id number]

## 2019-10-25 VITALS — SYSTOLIC BLOOD PRESSURE: 143 MMHG | TEMPERATURE: 98.2 F | DIASTOLIC BLOOD PRESSURE: 66 MMHG

## 2019-10-25 RX ADMIN — INSULIN GLARGINE SCH UNITS: 100 INJECTION, SOLUTION SUBCUTANEOUS at 08:34

## 2019-10-25 RX ADMIN — HYDROCODONE BITARTRATE AND ACETAMINOPHEN PRN TAB: 10; 325 TABLET ORAL at 08:37

## 2019-10-25 NOTE — DIS
DATE OF ADMISSION:  10/21/2019



DATE OF DISCHARGE:  10/25/2019



HOSPITAL COURSE:  Ms. Frye is a 74-year-old white female, who presented to the

emergency room with lightheadedness.  She was found to have a leukocytosis with a

white count of 23,000, and found to have urinary tract infection.  She was started

on Zosyn and vancomycin, but soon grew out E coli.  We stopped her vancomycin.  She

will have her last dosed this morning starting at 6 o'clock when she finishes, she

will be discharged. 



She states she is feeling much better and feels like she is back to her normal.  As

a matter of fact, two days ago, she felt back to normal and want to be discharged. 



OBJECTIVE:  VITAL SIGNS:  Reveal blood pressure this morning 169/59, pulse 80,

respirations 20, O2 saturation 95% on room air, and T-max 96.6. 

GENERAL:  This is a well-developed, well-nourished, very pleasant 74-year-old white

female, in no apparent distress this morning. 

HEENT:  Reveals normocephalic and nontraumatic cranium.  Pupils are equal, round,

and reactive.  Extraocular movements are intact.  Nose and throat are slightly dry. 

NECK:  Supple without masses, nodes, or bruits. 

CHEST:  Clear to auscultation.  No rales, rhonchi, or wheezes are heard. 

HEART:  Reveals a regular rate and rhythm without murmurs, gallops, or rubs. 

ABDOMEN:  Soft, nontender without organomegaly.  Normal bowel sounds are noted.  No

rebound or guarding is noted. 

: Deferred. 

EXTREMITIES: Reveal no clubbing, cyanosis, or edema. 

NEUROLOGIC:  She is oriented x4, back to her normal self.  She expresses a wish to

be discharged this morning. 



LABORATORY DATA:  Has leukocytosis, white count was 6,900.



IMPRESSION:  

1. Urinary tract infection, specifically E coli sensitive to Zosyn, which she

finished the full 7 days this morning's dose. 

2. Dehydration, resolved.

3. Sepsis, resolved.

4. Diabetes type 2, improved.

5. Hypertension.

6. Hyperlipidemia.

7. Anxiety and depressive disorder.

8. Arthritis.

9. Gastroesophageal reflux disease.

10. Leaky bladder with incontinence.

11. Allergic rhinitis.



PLAN:  

1. The patient will finish the last dose this morning.  The patient is much improved

and back to her normal self. 

2. The patient will be discharged. 

The patient will continue on her home medications which include the following;

1. Voltaren gel topically p.r.n.

2. Diltiazem 100 mg daily.

3. Cymbalta 60 mg daily.

4. Gabapentin 600 mg q.i.d. scheduled.

5. Lantus 18 units subcu b.i.d.

6. Lisinopril 5 mg b.i.d.

7. Ditropan 5 mg daily.

8. Actos 45 mg daily.

9. Hydrocodone, which is Norco 10/325 q.6 hours p.r.n. 

Plan, the patient is ready for discharge after she gets her last dose.







Job ID:  [id number]

## 2019-11-25 ENCOUNTER — HOSPITAL ENCOUNTER (OUTPATIENT)
Dept: HOSPITAL 18 - NAV RAD | Age: 74
Discharge: HOME | End: 2019-11-25
Attending: FAMILY MEDICINE
Payer: MEDICARE

## 2019-11-25 DIAGNOSIS — J06.9: Primary | ICD-10-CM

## 2019-11-25 DIAGNOSIS — J98.4: ICD-10-CM

## 2019-11-25 PROCEDURE — 71046 X-RAY EXAM CHEST 2 VIEWS: CPT

## 2019-11-25 NOTE — RAD
EXAM:

Two views chest



PROVIDED CLINICAL HISTORY:

Acute upper respiratory infection.



COMPARISON:

10/18/2019.



FINDINGS:

Cardiac silhouette and pulmonary vasculature are within normal limits.  Again noted are mild increase
 in interstitial markings bilaterally likely attributable to mild chronic lung changes. No

consolidation or pleural fluid is identified. Degenerative changes are seen in the spine. Vascular ca
lcifications are again seen in the thoracic aorta.



IMPRESSION:

Stable mild chronic lung changes without evidence of an acute cardiopulmonary process..



Reported By: Romaine Lindo 

Electronically Signed:  11/25/2019 4:35 PM

## 2019-11-30 ENCOUNTER — HOSPITAL ENCOUNTER (EMERGENCY)
Dept: HOSPITAL 18 - NAV ERS | Age: 74
Discharge: HOME | End: 2019-11-30
Payer: MEDICARE

## 2019-11-30 DIAGNOSIS — E11.9: ICD-10-CM

## 2019-11-30 DIAGNOSIS — I10: ICD-10-CM

## 2019-11-30 DIAGNOSIS — R05: Primary | ICD-10-CM

## 2019-11-30 DIAGNOSIS — F17.210: ICD-10-CM

## 2019-11-30 PROCEDURE — 71046 X-RAY EXAM CHEST 2 VIEWS: CPT

## 2019-11-30 NOTE — RAD
RADIOGRAPH CHEST 2 VIEWS:



DATE:

11/30/2019



HISTORY:

74-year-old female with cough



COMPARISON:

11/29/2019 and 7/11/2019



FINDINGS:

There is no airspace density, pulmonary edema, pleural effusion, pneumothorax, or cardiomegaly. There
 are chronic mild diffuse interstitial changes throughout all lung fields. No interval change

overall.



IMPRESSION:



1. No acute cardiopulmonary findings.

2. Diffuse mild chronic interstitial pulmonary changes.



Reported By: Drew Jeffers 

Electronically Signed:  11/30/2019 4:41 PM

## 2019-12-03 ENCOUNTER — HOSPITAL ENCOUNTER (OUTPATIENT)
Dept: HOSPITAL 18 - NAV CT | Age: 74
Discharge: HOME | End: 2019-12-03
Payer: MEDICARE

## 2019-12-03 DIAGNOSIS — H74.8X1: ICD-10-CM

## 2019-12-03 DIAGNOSIS — J30.9: Primary | ICD-10-CM

## 2019-12-03 NOTE — CT
CT Sinuses WO Con





INDICATION: Recurrent sinusitis





COMPARISON: None





FINDINGS:



Maxillary sinuses: Clear

Frontal sinus: Clear

Sphenoid sinus: Clear

Ethmoid sinus: Clear

Mastoid air cells: There is a right mastoid effusion. The visualized left mastoids are clear.

Nasal Septum: Unremarkable

Incidental findings: None of significance.





IMPRESSION:

Right mastoid effusion

No appreciable paranasal sinus disease.





Reported By: Carmelo Wilkinson 

Electronically Signed:  12/3/2019 4:51 PM